# Patient Record
Sex: FEMALE | Race: WHITE | NOT HISPANIC OR LATINO | Employment: OTHER | URBAN - METROPOLITAN AREA
[De-identification: names, ages, dates, MRNs, and addresses within clinical notes are randomized per-mention and may not be internally consistent; named-entity substitution may affect disease eponyms.]

---

## 2024-03-06 ENCOUNTER — OFFICE VISIT (OUTPATIENT)
Dept: FAMILY MEDICINE CLINIC | Facility: CLINIC | Age: 64
End: 2024-03-06
Payer: COMMERCIAL

## 2024-03-06 VITALS
DIASTOLIC BLOOD PRESSURE: 52 MMHG | OXYGEN SATURATION: 98 % | HEIGHT: 64 IN | WEIGHT: 128.6 LBS | TEMPERATURE: 98.4 F | HEART RATE: 54 BPM | BODY MASS INDEX: 21.95 KG/M2 | SYSTOLIC BLOOD PRESSURE: 132 MMHG

## 2024-03-06 DIAGNOSIS — Z00.00 ANNUAL PHYSICAL EXAM: Primary | ICD-10-CM

## 2024-03-06 DIAGNOSIS — I10 ESSENTIAL HYPERTENSION: ICD-10-CM

## 2024-03-06 PROCEDURE — 99396 PREV VISIT EST AGE 40-64: CPT | Performed by: NURSE PRACTITIONER

## 2024-03-06 RX ORDER — PHENOL 1.4 %
600 AEROSOL, SPRAY (ML) MUCOUS MEMBRANE DAILY
COMMUNITY

## 2024-03-06 NOTE — PROGRESS NOTES
"Chief Complaint  Annual Exam and Hypertension    SUBJECTIVE  Roxanne Trujillo presents to [facility]    Patient presents for annual physical exam with lab    Hypertension:  Patient is taking Lisinopril.  Patient's Blood Pressure in clinic today is 152/72.  Patient does monitor blood pressure at home with readings of 140s-150s/70s-80s.  Patient denies chest pain, shortness of air, headache, flushing, abnormal swelling in feet/ankles.  Patient's cardiologist is JOSIANE Armas.    Osteopenia:  Patient is taking Calcium daily.    History of Present Illness  Past Medical History:   Diagnosis Date    Asthma     Essential hypertension     Heart attack     Heart murmur       Family History   Problem Relation Age of Onset    Prostate cancer Father       Past Surgical History:   Procedure Laterality Date    CARDIAC CATHETERIZATION       SECTION  , ,     CHOLECYSTECTOMY      COLONOSCOPY  2020    TUBAL ABDOMINAL LIGATION          Current Outpatient Medications:     calcium carbonate (OS-KIN) 600 MG tablet, Take 1 tablet by mouth Daily., Disp: , Rfl:     lisinopril (PRINIVIL,ZESTRIL) 40 MG tablet, Take 1 tablet by mouth Daily., Disp: 90 tablet, Rfl: 3    OBJECTIVE  Vital Signs:   /52 (BP Location: Left arm, Patient Position: Sitting, Cuff Size: Large Adult)   Pulse 54   Temp 98.4 °F (36.9 °C) (Oral)   Ht 162.6 cm (64\")   Wt 58.3 kg (128 lb 9.6 oz)   SpO2 98%   BMI 22.07 kg/m²    Estimated body mass index is 22.07 kg/m² as calculated from the following:    Height as of this encounter: 162.6 cm (64\").    Weight as of this encounter: 58.3 kg (128 lb 9.6 oz).     Wt Readings from Last 3 Encounters:   24 58.3 kg (128 lb 9.6 oz)   09/15/22 56.4 kg (124 lb 6.4 oz)   22 59 kg (130 lb)     BP Readings from Last 3 Encounters:   24 132/52   09/15/22 150/78   22 149/80       Physical Exam  Vitals reviewed.   Constitutional:       Appearance: Normal appearance. " She is well-developed.   HENT:      Head: Normocephalic and atraumatic.      Right Ear: External ear normal.      Left Ear: External ear normal.      Mouth/Throat:      Pharynx: No oropharyngeal exudate.   Eyes:      Conjunctiva/sclera: Conjunctivae normal.      Pupils: Pupils are equal, round, and reactive to light.   Neck:      Vascular: No carotid bruit.   Cardiovascular:      Rate and Rhythm: Normal rate and regular rhythm.      Pulses: Normal pulses.      Heart sounds: Murmur heard.      No friction rub. No gallop.   Pulmonary:      Effort: Pulmonary effort is normal.      Breath sounds: Normal breath sounds. No wheezing or rhonchi.   Skin:     General: Skin is warm and dry.   Neurological:      Mental Status: She is alert and oriented to person, place, and time.      Cranial Nerves: No cranial nerve deficit.   Psychiatric:         Mood and Affect: Mood and affect normal.         Behavior: Behavior normal.         Thought Content: Thought content normal.         Judgment: Judgment normal.          Result Review        Mammo Screening Digital Tomosynthesis Bilateral With CAD    Result Date: 1/16/2024   Benign mammogram. Suggest routine mammographic screening.  RECOMMENDATION(S):  ROUTINE MAMMOGRAM AND CLINICAL EVALUATION IN 12 MONTHS.   BIRADS:  DIAGNOSTIC CATEGORY 1--NEGATIVE.   BREAST COMPOSITION: Extremely dense, which lowers the sensitivity of mammography.  PLEASE NOTE:  A NORMAL MAMMOGRAM DOES NOT EXCLUDE THE POSSIBILITY OF BREAST CANCER. ANY CLINICALLY SUSPICIOUS PALPABLE LUMP SHOULD BE BIOPSIED.      EDVIN GRAY MD       Electronically Signed and Approved By: EDVIN GRAY MD on 1/16/2024 at 16:11                 The above data has been reviewed by TAMAR Fang 03/06/2024 11:30 EST.          Patient Care Team:  Maricruz Garcia APRN as PCP - General (Family Medicine)  Henry Sarkar MD as Consulting Physician (Cardiology)    BMI is within normal parameters. No other follow-up for BMI  required.       ASSESSMENT & PLAN    Diagnoses and all orders for this visit:    1. Annual physical exam (Primary)  -     Comprehensive Metabolic Panel; Future  -     CBC & Differential; Future  -     TSH; Future  -     Lipid Panel; Future  -     Hepatitis C antibody; Future    2. Essential hypertension  Comments:  BP well-controlled, continue current medication       The patient is advised to continue current medications, continue current healthy lifestyle patterns, and return for routine annual checkups.    Tobacco Use: Low Risk  (3/6/2024)    Patient History     Smoking Tobacco Use: Never     Smokeless Tobacco Use: Never     Passive Exposure: Not on file       Follow Up     Return in about 1 year (around 3/6/2025) for Annual physical.      Patient was given instructions and counseling regarding her condition or for health maintenance advice. Please see specific information pulled into the AVS if appropriate.   I have reviewed information obtained and documented by others and I have confirmed the accuracy of this documented note.    TAMAR Fang

## 2024-05-13 ENCOUNTER — TELEPHONE (OUTPATIENT)
Dept: FAMILY MEDICINE CLINIC | Facility: CLINIC | Age: 64
End: 2024-05-13
Payer: COMMERCIAL

## 2024-08-05 ENCOUNTER — LAB (OUTPATIENT)
Dept: LAB | Facility: HOSPITAL | Age: 64
End: 2024-08-05
Payer: COMMERCIAL

## 2024-08-05 DIAGNOSIS — Z00.00 ANNUAL PHYSICAL EXAM: ICD-10-CM

## 2024-08-05 LAB
ALBUMIN SERPL-MCNC: 4 G/DL (ref 3.5–5.2)
ALBUMIN/GLOB SERPL: 1.3 G/DL
ALP SERPL-CCNC: 82 U/L (ref 39–117)
ALT SERPL W P-5'-P-CCNC: 20 U/L (ref 1–33)
ANION GAP SERPL CALCULATED.3IONS-SCNC: 8.9 MMOL/L (ref 5–15)
AST SERPL-CCNC: 25 U/L (ref 1–32)
BASOPHILS # BLD AUTO: 0.07 10*3/MM3 (ref 0–0.2)
BASOPHILS NFR BLD AUTO: 1.3 % (ref 0–1.5)
BILIRUB SERPL-MCNC: 0.9 MG/DL (ref 0–1.2)
BUN SERPL-MCNC: 16 MG/DL (ref 8–23)
BUN/CREAT SERPL: 24.2 (ref 7–25)
CALCIUM SPEC-SCNC: 9.1 MG/DL (ref 8.6–10.5)
CHLORIDE SERPL-SCNC: 103 MMOL/L (ref 98–107)
CHOLEST SERPL-MCNC: 179 MG/DL (ref 0–200)
CO2 SERPL-SCNC: 29.1 MMOL/L (ref 22–29)
CREAT SERPL-MCNC: 0.66 MG/DL (ref 0.57–1)
DEPRECATED RDW RBC AUTO: 46.8 FL (ref 37–54)
EGFRCR SERPLBLD CKD-EPI 2021: 99.9 ML/MIN/1.73
EOSINOPHIL # BLD AUTO: 0.16 10*3/MM3 (ref 0–0.4)
EOSINOPHIL NFR BLD AUTO: 2.9 % (ref 0.3–6.2)
ERYTHROCYTE [DISTWIDTH] IN BLOOD BY AUTOMATED COUNT: 13.8 % (ref 12.3–15.4)
GLOBULIN UR ELPH-MCNC: 3 GM/DL
GLUCOSE SERPL-MCNC: 93 MG/DL (ref 65–99)
HCT VFR BLD AUTO: 42.5 % (ref 34–46.6)
HCV AB SER QL: NORMAL
HDLC SERPL-MCNC: 76 MG/DL (ref 40–60)
HGB BLD-MCNC: 13.5 G/DL (ref 12–15.9)
IMM GRANULOCYTES # BLD AUTO: 0.01 10*3/MM3 (ref 0–0.05)
IMM GRANULOCYTES NFR BLD AUTO: 0.2 % (ref 0–0.5)
LDLC SERPL CALC-MCNC: 92 MG/DL (ref 0–100)
LDLC/HDLC SERPL: 1.21 {RATIO}
LYMPHOCYTES # BLD AUTO: 1.79 10*3/MM3 (ref 0.7–3.1)
LYMPHOCYTES NFR BLD AUTO: 32.5 % (ref 19.6–45.3)
MCH RBC QN AUTO: 29.4 PG (ref 26.6–33)
MCHC RBC AUTO-ENTMCNC: 31.8 G/DL (ref 31.5–35.7)
MCV RBC AUTO: 92.6 FL (ref 79–97)
MONOCYTES # BLD AUTO: 0.36 10*3/MM3 (ref 0.1–0.9)
MONOCYTES NFR BLD AUTO: 6.5 % (ref 5–12)
NEUTROPHILS NFR BLD AUTO: 3.11 10*3/MM3 (ref 1.7–7)
NEUTROPHILS NFR BLD AUTO: 56.6 % (ref 42.7–76)
NRBC BLD AUTO-RTO: 0 /100 WBC (ref 0–0.2)
PLATELET # BLD AUTO: 214 10*3/MM3 (ref 140–450)
PMV BLD AUTO: 12 FL (ref 6–12)
POTASSIUM SERPL-SCNC: 4 MMOL/L (ref 3.5–5.2)
PROT SERPL-MCNC: 7 G/DL (ref 6–8.5)
RBC # BLD AUTO: 4.59 10*6/MM3 (ref 3.77–5.28)
SODIUM SERPL-SCNC: 141 MMOL/L (ref 136–145)
TRIGL SERPL-MCNC: 56 MG/DL (ref 0–150)
TSH SERPL DL<=0.05 MIU/L-ACNC: 1.37 UIU/ML (ref 0.27–4.2)
VLDLC SERPL-MCNC: 11 MG/DL (ref 5–40)
WBC NRBC COR # BLD AUTO: 5.5 10*3/MM3 (ref 3.4–10.8)

## 2024-08-05 PROCEDURE — 80050 GENERAL HEALTH PANEL: CPT

## 2024-08-05 PROCEDURE — 86803 HEPATITIS C AB TEST: CPT

## 2024-08-05 PROCEDURE — 80061 LIPID PANEL: CPT

## 2024-08-05 PROCEDURE — 36415 COLL VENOUS BLD VENIPUNCTURE: CPT

## 2024-08-06 ENCOUNTER — TELEPHONE (OUTPATIENT)
Dept: FAMILY MEDICINE CLINIC | Facility: CLINIC | Age: 64
End: 2024-08-06
Payer: COMMERCIAL

## 2024-08-06 NOTE — TELEPHONE ENCOUNTER
Name: Roxanne Trujillo    Relationship: Self    Best Callback Number: [phone]    HUB PROVIDED THE RELAY MESSAGE FROM THE OFFICE   PATIENT VOICED UNDERSTANDING AND HAS NO FURTHER QUESTIONS AT THIS TIME

## 2024-08-06 NOTE — TELEPHONE ENCOUNTER
OK FOR HUB TO RELAY:    Please inform patient about labs:    Message from Maricruz Garcia sent at 8/6/2024  8:55 AM EDT -----  Call patient-normal labs, continue all current medications.

## 2024-08-06 NOTE — TELEPHONE ENCOUNTER
A user error has taken place: encounter opened in error, closed for administrative reasons.    abdominal pain

## 2025-01-29 ENCOUNTER — OFFICE VISIT (OUTPATIENT)
Dept: CARDIOLOGY | Facility: CLINIC | Age: 65
End: 2025-01-29
Payer: COMMERCIAL

## 2025-01-29 VITALS
HEIGHT: 64 IN | BODY MASS INDEX: 21.95 KG/M2 | SYSTOLIC BLOOD PRESSURE: 150 MMHG | DIASTOLIC BLOOD PRESSURE: 96 MMHG | HEART RATE: 52 BPM | WEIGHT: 128.6 LBS

## 2025-01-29 DIAGNOSIS — I10 ESSENTIAL HYPERTENSION: Primary | ICD-10-CM

## 2025-01-29 DIAGNOSIS — I20.1 VASOSPASTIC ANGINA: ICD-10-CM

## 2025-01-29 PROCEDURE — 99213 OFFICE O/P EST LOW 20 MIN: CPT | Performed by: NURSE PRACTITIONER

## 2025-01-29 RX ORDER — LISINOPRIL 40 MG/1
40 TABLET ORAL DAILY
Qty: 90 TABLET | Refills: 3 | Status: SHIPPED | OUTPATIENT
Start: 2025-01-29

## 2025-01-29 NOTE — PROGRESS NOTES
"Chief Complaint  Follow-up and Hypertension    Subjective            History of Present Illness  Roxanne Trujillo is a 64-year-old female patient who presents to the office today for follow-up.  She has hypertension and vasospastic angina.  She checks blood pressure at home with ranges typically in the \"130/70-80's\".  She reports compliance with lisinopril 40 mg daily.  She denies any new or worsening cardiac symptoms today.    PMH  Past Medical History:   Diagnosis Date    Asthma     Essential hypertension     Heart attack     Heart murmur          ALLERGY  Allergies   Allergen Reactions    Penicillins Unknown - Low Severity          SURGICALHX  Past Surgical History:   Procedure Laterality Date    CARDIAC CATHETERIZATION       SECTION  , ,     CHOLECYSTECTOMY      COLONOSCOPY  2020    TUBAL ABDOMINAL LIGATION            SOC  Social History     Socioeconomic History    Marital status:    Tobacco Use    Smoking status: Never    Smokeless tobacco: Never   Vaping Use    Vaping status: Never Used   Substance and Sexual Activity    Alcohol use: Never    Drug use: Never    Sexual activity: Defer         FAMHX  Family History   Problem Relation Age of Onset    Prostate cancer Father           MEDSIGONLY  Current Outpatient Medications on File Prior to Visit   Medication Sig    calcium carbonate (OS-KIN) 600 MG tablet Take 1 tablet by mouth Daily.    lisinopril (PRINIVIL,ZESTRIL) 40 MG tablet Take 1 tablet by mouth Daily.     No current facility-administered medications on file prior to visit.         Objective   /96   Pulse 52   Ht 162.6 cm (64.02\")   Wt 58.3 kg (128 lb 9.6 oz)   BMI 22.06 kg/m²       Physical Exam  Constitutional:       Appearance: She is normal weight.   HENT:      Head: Normocephalic.   Neck:      Vascular: No carotid bruit.   Cardiovascular:      Rate and Rhythm: Regular rhythm. Bradycardia present.      Pulses: Normal pulses.      Heart sounds: Normal heart sounds. " "No murmur heard.  Pulmonary:      Effort: Pulmonary effort is normal.      Breath sounds: Normal breath sounds.   Musculoskeletal:      Cervical back: Neck supple.      Right lower leg: No edema.      Left lower leg: No edema.   Skin:     General: Skin is dry.   Neurological:      Mental Status: She is alert and oriented to person, place, and time.   Psychiatric:         Behavior: Behavior normal.       Result Review :   The following data was reviewed by: TAMAR Herndon on 01/29/2025:  No results found for: \"PROBNP\"  CMP          8/5/2024    08:17   CMP   Glucose 93    BUN 16    Creatinine 0.66    EGFR 99.9    Sodium 141    Potassium 4.0    Chloride 103    Calcium 9.1    Total Protein 7.0    Albumin 4.0    Globulin 3.0    Total Bilirubin 0.9    Alkaline Phosphatase 82    AST (SGOT) 25    ALT (SGPT) 20    Albumin/Globulin Ratio 1.3    BUN/Creatinine Ratio 24.2    Anion Gap 8.9          8/5/2024    08:17   CBC w/Diff   WBC 5.50    RBC 4.59    Hemoglobin 13.5    Hematocrit 42.5    MCV 92.6    MCH 29.4    MCHC 31.8    RDW 13.8    Platelets 214    Neutrophil Rel % 56.6    Immature Granulocyte Rel % 0.2    Lymphocyte Rel % 32.5    Monocyte Rel % 6.5    Eosinophil Rel % 2.9    Basophil Rel % 1.3       Lab Results   Component Value Date    TSH 1.370 08/05/2024      No results found for: \"FREET4\"   No results found for: \"DDIMERQUANT\"  No results found for: \"MG\"   No results found for: \"DIGOXIN\"   No results found for: \"TROPONINT\"        Lipid Panel          8/5/2024    08:17   Lipid Panel   Total Cholesterol 179    Triglycerides 56    HDL Cholesterol 76    VLDL Cholesterol 11    LDL Cholesterol  92    LDL/HDL Ratio 1.21        Results for orders placed in visit on 09/29/21    Adult Transthoracic Echo Complete W/ Cont if Necessary Per Protocol    Interpretation Summary  · Calculated left ventricular EF = 66% Estimated left ventricular EF was in agreement with the calculated left ventricular EF. Left ventricular " systolic function is normal.  · Estimated right ventricular systolic pressure from tricuspid regurgitation is normal (<35 mmHg).  · There is a small (<1cm) pericardial effusion.  · Tricuspid regurgitation trace to mild           Assessment and Plan    Diagnoses and all orders for this visit:    1. Essential hypertension (Primary)  Blood pressure is elevated in office today however is controlled at home, and from now continue lisinopril 40 mg daily. Check blood pressure twice a day for the next two weeks, blood pressure log provided for patient.  Will review log once available to me will make any necessary medication adjustments at that time.    2. Vasospastic angina   She denies any new or ongoing anginal symptoms.  Continue to monitor.      Other orders  -     lisinopril (PRINIVIL,ZESTRIL) 40 MG tablet; Take 1 tablet by mouth Daily.  Dispense: 90 tablet; Refill: 3            Follow Up   Return in about 1 year (around 1/29/2026) for Follow up with Dr Sarkar.    Patient was given instructions and counseling regarding her condition or for health maintenance advice. Please see specific information pulled into the AVS if appropriate.     Roxanne Cassandra  reports that she has never smoked. She has never used smokeless tobacco.          Yue Armas, APRN  01/29/25  09:07 EST    Dictated Utilizing Dragon Dictation

## 2025-06-04 ENCOUNTER — TRANSCRIBE ORDERS (OUTPATIENT)
Dept: FAMILY MEDICINE CLINIC | Facility: CLINIC | Age: 65
End: 2025-06-04
Payer: COMMERCIAL

## 2025-06-04 DIAGNOSIS — Z12.31 SCREENING MAMMOGRAM FOR BREAST CANCER: Primary | ICD-10-CM

## 2025-08-06 ENCOUNTER — HOSPITAL ENCOUNTER (OUTPATIENT)
Dept: MAMMOGRAPHY | Facility: HOSPITAL | Age: 65
Discharge: HOME | End: 2025-08-06
Admitting: NURSE PRACTITIONER
Payer: COMMERCIAL

## 2025-08-06 DIAGNOSIS — Z12.31 SCREENING MAMMOGRAM FOR BREAST CANCER: ICD-10-CM

## 2025-08-06 PROCEDURE — 77067 SCR MAMMO BI INCL CAD: CPT

## 2025-08-06 PROCEDURE — 77063 BREAST TOMOSYNTHESIS BI: CPT
